# Patient Record
Sex: MALE | Race: BLACK OR AFRICAN AMERICAN | NOT HISPANIC OR LATINO
[De-identification: names, ages, dates, MRNs, and addresses within clinical notes are randomized per-mention and may not be internally consistent; named-entity substitution may affect disease eponyms.]

---

## 2020-03-11 PROBLEM — Z00.00 ENCOUNTER FOR PREVENTIVE HEALTH EXAMINATION: Status: ACTIVE | Noted: 2020-03-11

## 2020-04-15 ENCOUNTER — APPOINTMENT (OUTPATIENT)
Dept: NEUROLOGY | Facility: CLINIC | Age: 40
End: 2020-04-15
Payer: COMMERCIAL

## 2020-04-15 DIAGNOSIS — K21.9 GASTRO-ESOPHAGEAL REFLUX DISEASE W/OUT ESOPHAGITIS: ICD-10-CM

## 2020-04-15 DIAGNOSIS — Z78.9 OTHER SPECIFIED HEALTH STATUS: ICD-10-CM

## 2020-04-15 DIAGNOSIS — Z72.89 OTHER PROBLEMS RELATED TO LIFESTYLE: ICD-10-CM

## 2020-04-15 PROCEDURE — 99204 OFFICE O/P NEW MOD 45 MIN: CPT | Mod: 95

## 2020-04-15 NOTE — PHYSICAL EXAM
[FreeTextEntry1] : Physical examination \par General: No acute distress, Awake, Alert. \par \par Mental status \par Awake, alert, and oriented to person, time and place, Normal attention span and concentration, Recent and remote memory intact, Language intact, Fund of knowledge intact. \par Cranial Nerves \par II: VFF \par III, IV, VI: PERRL, EOMI. \par V: Facial sensation is normal B/L. \par VII: Facial strength is normal B/L. \par VIII: Gross hearing is intact. \par IX, X: Palate is midline and elevates symmetrically. \par XI: Trapezius normal strength. \par XII: Tongue midline without atrophy or fasciculations. \par \par Motor exam \par Moving all 4 extremities equally\par \par Coordination \par Finger to nose: Normal. \par Heel to shin: Normal. \par \par Gait \par Normal\par

## 2020-04-15 NOTE — HISTORY OF PRESENT ILLNESS
[Home] : at home, [unfilled] , at the time of the visit. [Other Location: e.g. Home (Enter Location, City,State)___] : at [unfilled] [Patient] : the patient [Self] : self [FreeTextEntry1] : Chuck is a 38 y/o man who has been having sharp stabbing pains in his head for over one year. The pains are very sharp lasting a few seconds. Always over the left parietal area. Can occur multiple times during the day but don't occur everyday. No neck pain, no tension in shoulders. No other headaches. No light or sound sensitivity. No nausea or vomiting. No lateralizing complaints. Used to spend a lot of time at the gym but hasn’t been going due to current COVID pandemic.

## 2020-10-08 ENCOUNTER — APPOINTMENT (OUTPATIENT)
Dept: NEUROLOGY | Facility: CLINIC | Age: 40
End: 2020-10-08
Payer: COMMERCIAL

## 2020-10-08 DIAGNOSIS — R51.9 HEADACHE, UNSPECIFIED: ICD-10-CM

## 2020-10-08 DIAGNOSIS — R03.0 ELEVATED BLOOD-PRESSURE READING, W/OUT DIAGNOSIS OF HYPERTENSION: ICD-10-CM

## 2020-10-08 PROCEDURE — 99214 OFFICE O/P EST MOD 30 MIN: CPT

## 2020-10-08 NOTE — PHYSICAL EXAM
[FreeTextEntry1] : Physical examination \par General: No acute distress, Awake, Alert. \par Cardiovascular: Normal rate, Regular rhythm, No murmur. \par Chest - clear bilaterally\par \par Mental status \par Awake, alert, and oriented to person, time and place, Normal attention span and concentration, Recent and remote memory intact, Language intact, Fund of knowledge intact. \par Cranial Nerves \par II: VFF \par III, IV, VI: PERRL, EOMI. \par V: Facial sensation is normal B/L. \par VII: Facial strength is normal B/L. \par VIII: Gross hearing is intact. \par IX, X: Palate is midline and elevates symmetrically. \par XI: Trapezius normal strength. \par XII: Tongue midline without atrophy or fasciculations. \par \par Motor exam \par Muscle tone - no evidence of rigidity or resistance in all 4 extremities. \par No atrophy or fasciculations \par Muscle Strength: arms and legs, proximal and distal flexors and extensors are normal \par No UE drift.\par \par Reflexes \par All present, normal, and symmetrical. \par Plantars right: mute. \par Plantars left: mute. \par \par Coordination \par Finger to nose: Normal. \par Heel to shin: Normal. \par \par \par Gait \par Normal\par

## 2020-10-08 NOTE — ASSESSMENT
[FreeTextEntry1] : Differential includes primary stabbing headaches.\par Patient is here in followup. He continues to have sharp stabbing pains in his head for over one year. The pains are very sharp and last a few seconds. Again they tend to be in the left parietal period. They can occur multiple times during the day but are not present daily. With these pains he has no neck pain or tension in his shoulders. He did not bite her sound sensitivity or nausea or vomiting. No focal or lateralizing neurologic complaints. Since they have been persistent for almost 2 years, he came in for followup.

## 2020-10-08 NOTE — HISTORY OF PRESENT ILLNESS
[FreeTextEntry1] : Patient is here in followup. He continues to have sharp stabbing pains in his head for over one year. The pains are very sharp and last a few seconds. Again they tend to be in the left parietal period. They can occur multiple times during the day but are not present daily. With these pains he has no neck pain or tension in his shoulders. He did not bite her sound sensitivity or nausea or vomiting. No focal or lateralizing neurologic complaints. Since they have been persistent for almost 2 years, he came in for followup.

## 2020-10-20 ENCOUNTER — RESULT REVIEW (OUTPATIENT)
Age: 40
End: 2020-10-20

## 2021-03-10 ENCOUNTER — APPOINTMENT (OUTPATIENT)
Dept: NEUROLOGY | Facility: CLINIC | Age: 41
End: 2021-03-10
Payer: COMMERCIAL

## 2021-03-10 PROCEDURE — 99214 OFFICE O/P EST MOD 30 MIN: CPT | Mod: 95

## 2021-03-11 NOTE — PHYSICAL EXAM
[FreeTextEntry1] : Physical examination \par General: No acute distress, Awake, Alert. \par \par Mental status \par Awake, alert, and oriented to person, time and place, Normal attention span and concentration, Recent and remote memory intact, Language intact, Fund of knowledge intact. \par Cranial Nerves \par II: VFF \par III, IV, VI: EOMI. \par V: Facial sensation is normal B/L. \par VII: Facial strength is normal B/L. \par VIII: Gross hearing is intact. \par IX, X: Palate is midline and elevates symmetrically. \par XI: Trapezius normal strength. \par XII: Tongue midline without atrophy or fasciculations. \par \par Motor exam \par normal\par \par

## 2021-03-11 NOTE — ASSESSMENT
[FreeTextEntry1] : Neurologically he appears intact. He does not have any consistent pain or cramping or loss of strength. At this point we can monitor his symptoms. I suspect he has benign fasciculations. If symptoms don't improve with more attention to relaxation and exercise, I can proceed with an EMG. He will let me know how he is doing.

## 2021-03-11 NOTE — HISTORY OF PRESENT ILLNESS
[Home] : at home, [unfilled] , at the time of the visit. [Medical Office: ([facility])___] : at the medical office located in  [Verbal consent obtained from patient] : the patient, [unfilled] [FreeTextEntry1] : The patient is seeing me today with a new complaint. His headaches have essentially resolved.\par \par He has been noticing twitching in his biceps for months. He describes history of twitching and various muscle through out his body in the past. These were random and not consistent. He has no neck pain or low back pain. His strength is grade. He has been under a lot of stress and anxiety due to some deaths.\par Feels water intake to be better. He stopped caffeine. He is not sleeping as well.\par He noted when he attended the last few normal, he triggered his symptoms.\par \par He recalls having occasional twitching in his eyelids and previously in his legs. When he relaxes his symptoms improve.

## 2021-07-08 ENCOUNTER — APPOINTMENT (OUTPATIENT)
Dept: NEUROLOGY | Facility: CLINIC | Age: 41
End: 2021-07-08
Payer: COMMERCIAL

## 2021-07-08 DIAGNOSIS — E55.9 VITAMIN D DEFICIENCY, UNSPECIFIED: ICD-10-CM

## 2021-07-08 PROCEDURE — 99213 OFFICE O/P EST LOW 20 MIN: CPT | Mod: 95

## 2021-07-08 NOTE — HISTORY OF PRESENT ILLNESS
[Home] : at home, [unfilled] , at the time of the visit. [Medical Office: ([facility])___] : at the medical office located in  [Verbal consent obtained from patient] : the patient, [unfilled] [FreeTextEntry1] : 7/8/21\par Chuck continues to have twitching in his extremities. This involves legs and arms as before- occasionally the eye. It has not resolved. No burning pain in extremities, no paresthesias or weakness. He still is able to workout and lift at the gym. \par No neck or back pain.\par \par 3/10/21\par The patient is seeing me today with a new complaint. His headaches have essentially resolved.\par \par He has been noticing twitching in his biceps for months. He describes history of twitching and various muscle through out his body in the past. These were random and not consistent. He has no neck pain or low back pain. His strength is grade. He has been under a lot of stress and anxiety due to some deaths.\par Feels water intake to be better. He stopped caffeine. He is not sleeping as well.\par He noted when he attended the last few normal, he triggered his symptoms.\par \par He recalls having occasional twitching in his eyelids and previously in his legs. When he relaxes his symptoms improve.

## 2021-07-08 NOTE — ASSESSMENT
[FreeTextEntry1] : Neurologically he appears intact. He does not have any consistent pain or cramping or loss of strength. At this point we will proceed with electrodiagnostic testing and labs as outlined below.

## 2021-09-02 ENCOUNTER — APPOINTMENT (OUTPATIENT)
Dept: NEUROLOGY | Facility: CLINIC | Age: 41
End: 2021-09-02
Payer: COMMERCIAL

## 2021-09-02 VITALS
BODY MASS INDEX: 29.89 KG/M2 | DIASTOLIC BLOOD PRESSURE: 85 MMHG | SYSTOLIC BLOOD PRESSURE: 136 MMHG | WEIGHT: 186 LBS | TEMPERATURE: 97 F | HEIGHT: 66 IN | HEART RATE: 76 BPM

## 2021-09-02 LAB
25(OH)D3 SERPL-MCNC: 31.4 NG/ML
ALBUMIN SERPL ELPH-MCNC: 4.8 G/DL
ALP BLD-CCNC: 42 U/L
ALT SERPL-CCNC: 20 U/L
ANION GAP SERPL CALC-SCNC: 13 MMOL/L
AST SERPL-CCNC: 15 U/L
BASOPHILS # BLD AUTO: 0.05 K/UL
BASOPHILS NFR BLD AUTO: 0.6 %
BILIRUB SERPL-MCNC: 0.4 MG/DL
BUN SERPL-MCNC: 13 MG/DL
CALCIUM SERPL-MCNC: 10.4 MG/DL
CHLORIDE SERPL-SCNC: 99 MMOL/L
CK SERPL-CCNC: 150 U/L
CO2 SERPL-SCNC: 25 MMOL/L
CREAT SERPL-MCNC: 1.14 MG/DL
EOSINOPHIL # BLD AUTO: 0.24 K/UL
EOSINOPHIL NFR BLD AUTO: 2.7 %
GLUCOSE SERPL-MCNC: 104 MG/DL
HCT VFR BLD CALC: 49.7 %
HGB BLD-MCNC: 16.2 G/DL
IMM GRANULOCYTES NFR BLD AUTO: 0.3 %
LYMPHOCYTES # BLD AUTO: 2.3 K/UL
LYMPHOCYTES NFR BLD AUTO: 26 %
MAN DIFF?: NORMAL
MCHC RBC-ENTMCNC: 29.6 PG
MCHC RBC-ENTMCNC: 32.6 GM/DL
MCV RBC AUTO: 90.9 FL
MONOCYTES # BLD AUTO: 0.87 K/UL
MONOCYTES NFR BLD AUTO: 9.8 %
NEUTROPHILS # BLD AUTO: 5.36 K/UL
NEUTROPHILS NFR BLD AUTO: 60.6 %
PLATELET # BLD AUTO: 350 K/UL
POTASSIUM SERPL-SCNC: 4.5 MMOL/L
PROT SERPL-MCNC: 7.9 G/DL
RBC # BLD: 5.47 M/UL
RBC # FLD: 12.3 %
SODIUM SERPL-SCNC: 136 MMOL/L
TSH SERPL-ACNC: 0.91 UIU/ML
WBC # FLD AUTO: 8.85 K/UL

## 2021-09-02 PROCEDURE — 95909 NRV CNDJ TST 5-6 STUDIES: CPT

## 2021-09-02 PROCEDURE — 99072 ADDL SUPL MATRL&STAF TM PHE: CPT

## 2021-09-02 PROCEDURE — 95886 MUSC TEST DONE W/N TEST COMP: CPT

## 2021-09-16 DIAGNOSIS — R25.3 FASCICULATION: ICD-10-CM

## 2021-09-16 RX ORDER — CARBAMAZEPINE 100 MG/1
100 TABLET, CHEWABLE ORAL TWICE DAILY
Qty: 60 | Refills: 0 | Status: ACTIVE | COMMUNITY
Start: 2021-09-16 | End: 1900-01-01

## 2023-05-20 ENCOUNTER — NON-APPOINTMENT (OUTPATIENT)
Age: 43
End: 2023-05-20